# Patient Record
Sex: MALE | Race: BLACK OR AFRICAN AMERICAN | NOT HISPANIC OR LATINO | URBAN - METROPOLITAN AREA
[De-identification: names, ages, dates, MRNs, and addresses within clinical notes are randomized per-mention and may not be internally consistent; named-entity substitution may affect disease eponyms.]

---

## 2017-05-23 ENCOUNTER — OFFICE (OUTPATIENT)
Dept: URBAN - METROPOLITAN AREA CLINIC 12 | Facility: CLINIC | Age: 57
End: 2017-05-23

## 2017-05-23 VITALS
HEART RATE: 80 BPM | DIASTOLIC BLOOD PRESSURE: 81 MMHG | HEIGHT: 71 IN | WEIGHT: 164 LBS | SYSTOLIC BLOOD PRESSURE: 136 MMHG

## 2017-05-23 DIAGNOSIS — K85.90 ACUTE PANCREATITIS WITHOUT NECROSIS OR INFECTION, UNSPECIFIE: ICD-10-CM

## 2017-05-23 DIAGNOSIS — R93.3 ABNORMAL FINDINGS ON DIAGNOSTIC IMAGING OF OTHER PARTS OF DI: ICD-10-CM

## 2017-05-23 DIAGNOSIS — K86.1 OTHER CHRONIC PANCREATITIS: ICD-10-CM

## 2017-05-23 DIAGNOSIS — R10.13 EPIGASTRIC PAIN: ICD-10-CM

## 2017-05-23 DIAGNOSIS — R63.4 ABNORMAL WEIGHT LOSS: ICD-10-CM

## 2017-05-23 DIAGNOSIS — F17.200 NICOTINE DEPENDENCE, UNSPECIFIED, UNCOMPLICATED: ICD-10-CM

## 2017-05-23 PROCEDURE — 36415 COLL VENOUS BLD VENIPUNCTURE: CPT | Performed by: INTERNAL MEDICINE

## 2017-05-23 PROCEDURE — 99202 OFFICE O/P NEW SF 15 MIN: CPT | Performed by: INTERNAL MEDICINE

## 2017-05-23 NOTE — SERVICENOTES
The patient has evidence of chronic pancreatitis given extensive calcifications in the pancreas which could also explain the ductal dilation, however with that said he does also mild dilation of the bile duct and so could have either scarring of the area of the major papilla or could have a mass of the pancreas or ampulla as well.  He did have recent acute pancreatitis with inflammation around the head of the pancreas and so this would limit EUS evaluation at this time, however given his elevation of his liver enzymes he will likely need this sooner rather than later.  We will allow for a couple more weeks for the inflammation did die down and proceed with EUS for further evaluation in two weeks as opposed to waiting longer which I would prefer normally.  He should otherwise notify us of his abdominal pain returns or has any evidence of acute pancreatitis.  Then a low-fat diet and stay away from alcohol use.  He should stop smoking.  We may consider a trial of pancreatic enzymes if he has recurrent acute pancreatitis or starts having persistent abdominal pain or diarrhea.  He also has some sludge in his gallbladder and so would likely benefit from cholecystectomy, however he will need EUS evaluation 1st prior to any surgery.  He does have persistent elevation of his bile ducts and EUS is negative for mass he will likely need ERCP evaluation.  In interrupted recommend he continue nutritional supplements and stay on a low-fat diet.  He should notify us of his weight loss persists.

## 2017-05-23 NOTE — SERVICEHPINOTES
Mr. Melchor is a 57-year-old man here for evaluation of acute pancreatitis, chronic pancreatitis, and abnormal GI imaging. The patient states that he normally does not have any issues with abdominal pain but then started having issues in early 2017 for which she went to an urgent care. He states that because of acute worsening pain Lana the emergency department in early May 2017 where he was having epigastric pain that radiated around to his back. Blood work at that time did not reveal evidence of acute pancreatitis with lipase 1400. He also had CT scan of the abdomen that revealed findings of acute on chronic pancreatitis with extensive pancreatic calcification and pancreatic duct dilation with a "chain of lakes" appearance. The pancreatic head and neck were edematous with peripancreatic fluid and a 1.6 cm pancreatic head pseudocyst was noted. He underwent abdominal ultrasound as well which revealed a small amount of sludge in the gallbladder and some mild intra-and extra hepatic biliary ductal dilation with CBD measuring 8 mm. MRCP without contrast was also performed that again confirmed dilation of the pancreatic duct to 11 mm in the head, 9 mm in the body, and 6 mm in the tail. The common bile duct measured 8 mm. There was smooth tapering to the distal bile duct with no evidence of any mass of the distal aspect was not well seen. The patient was treated conservatively and was on be discharged couple days later. He does have elevation of his liver enzymes with most recent ones being bilirubin 1.6, alkaline phosphatase 500, , and . IgG4 subclasses were normal. Acute hepatitis panel was negative. It does not appear that CA-19-9 was checked. He states that overall he is doing better now. He does not have any abdominal pain but does note some symptoms that he does not eat too much. He states that he has had some constipation issues and denies any diarrhea. There is been no rectal bleeding. He has never had a colonoscopy. There is no family history of colon cancer, colon polyps, stomach cancer, or pancreatic cancer. He does have an extensive history of drinking alcohol at least 2-3 drinks a day for several years. He states that sometimes he would drink heavier. He states that he has lost around 10 pounds over the past month. He states that he stopped drinking four months ago. He does continue to smoke.

## 2017-05-24 LAB
CBC COMPLETE BLOOD COUNT W/O DIFF: HEMATOCRIT: 37.1 % — LOW (ref 39–55)
CBC COMPLETE BLOOD COUNT W/O DIFF: HEMOGLOBIN: 12.1 G/DL — LOW (ref 13–17.5)
CBC COMPLETE BLOOD COUNT W/O DIFF: MCH: 28.4 PG (ref 25–35)
CBC COMPLETE BLOOD COUNT W/O DIFF: MCHC: 32.6 % (ref 30–38)
CBC COMPLETE BLOOD COUNT W/O DIFF: MCV: 87.1 FL (ref 78–102)
CBC COMPLETE BLOOD COUNT W/O DIFF: PLATELET COUNT: 420 K/UL (ref 150–450)
CBC COMPLETE BLOOD COUNT W/O DIFF: RBC DISTRIBUTION WIDTH: 14.3 % (ref 11.5–16)
CBC COMPLETE BLOOD COUNT W/O DIFF: RED BLOOD CELL COUNT: 4.26 M/UL — LOW (ref 4.3–5.7)
CBC COMPLETE BLOOD COUNT W/O DIFF: WHITE BLOOD CELL COUNT: 7.3 K/UL (ref 4–11)
COMPREHENSIVE METABOLIC PANEL: ALBUMIN: 4.2 G/DL (ref 3.5–5.2)
COMPREHENSIVE METABOLIC PANEL: ALKALINE PHOSPHATASE: 611 U/L — HIGH (ref 46–118)
COMPREHENSIVE METABOLIC PANEL: CALCIUM TOTAL: 10.3 MG/DL (ref 8.5–10.5)
COMPREHENSIVE METABOLIC PANEL: CARBON DIOXIDE: 27 MEQ/L (ref 21–31)
COMPREHENSIVE METABOLIC PANEL: CHLORIDE: 101 MEQ/L (ref 96–106)
COMPREHENSIVE METABOLIC PANEL: CREATININE: 0.79 MG/DL — LOW (ref 0.8–1.4)
COMPREHENSIVE METABOLIC PANEL: FASTING/NON-FASTING: (no result)
COMPREHENSIVE METABOLIC PANEL: GLUCOSE: 87 MG/DL (ref 65–100)
COMPREHENSIVE METABOLIC PANEL: POTASSIUM: 5.1 MEQ/ML (ref 3.5–5.4)
COMPREHENSIVE METABOLIC PANEL: SGOT (AST): 147 U/L — HIGH (ref 13–40)
COMPREHENSIVE METABOLIC PANEL: SGPT (ALT): 161 U/L — HIGH (ref 7–52)
COMPREHENSIVE METABOLIC PANEL: SODIUM: 141 MEQ/L (ref 135–145)
COMPREHENSIVE METABOLIC PANEL: TOTAL BILIRUBIN: 0.8 MG/DL (ref 0.3–1.2)
COMPREHENSIVE METABOLIC PANEL: TOTAL PROTEIN: 6.7 G/DL (ref 6.4–8.3)
COMPREHENSIVE METABOLIC PANEL: UREA NITROGEN: 6 MG/DL (ref 6–20)

## 2017-06-06 ENCOUNTER — ON CAMPUS - OUTPATIENT (OUTPATIENT)
Dept: URBAN - METROPOLITAN AREA HOSPITAL 125 | Facility: HOSPITAL | Age: 57
End: 2017-06-06

## 2017-06-06 DIAGNOSIS — K86.0 ALCOHOL-INDUCED CHRONIC PANCREATITIS: ICD-10-CM

## 2017-06-06 DIAGNOSIS — K85.90 ACUTE PANCREATITIS WITHOUT NECROSIS OR INFECTION, UNSPECIFIE: ICD-10-CM

## 2017-06-06 DIAGNOSIS — K83.8 OTHER SPECIFIED DISEASES OF BILIARY TRACT: ICD-10-CM

## 2017-06-06 PROCEDURE — 43237 ENDOSCOPIC US EXAM ESOPH: CPT | Performed by: INTERNAL MEDICINE

## 2017-07-07 ENCOUNTER — OFFICE (OUTPATIENT)
Dept: URBAN - METROPOLITAN AREA CLINIC 11 | Facility: CLINIC | Age: 57
End: 2017-07-07

## 2017-07-07 DIAGNOSIS — F17.200 NICOTINE DEPENDENCE, UNSPECIFIED, UNCOMPLICATED: ICD-10-CM

## 2017-07-07 DIAGNOSIS — K86.1 OTHER CHRONIC PANCREATITIS: ICD-10-CM

## 2017-07-07 DIAGNOSIS — R10.13 EPIGASTRIC PAIN: ICD-10-CM

## 2017-07-07 DIAGNOSIS — K59.00 CONSTIPATION, UNSPECIFIED: ICD-10-CM

## 2017-07-07 DIAGNOSIS — R94.5 ABNORMAL RESULTS OF LIVER FUNCTION STUDIES: ICD-10-CM

## 2017-07-07 DIAGNOSIS — R63.4 ABNORMAL WEIGHT LOSS: ICD-10-CM

## 2017-07-07 PROCEDURE — 99213 OFFICE O/P EST LOW 20 MIN: CPT | Performed by: INTERNAL MEDICINE

## 2017-07-10 ENCOUNTER — INPATIENT HOSPITAL (OUTPATIENT)
Dept: URBAN - METROPOLITAN AREA HOSPITAL 95 | Facility: HOSPITAL | Age: 57
End: 2017-07-10

## 2017-07-10 DIAGNOSIS — K85.90 ACUTE PANCREATITIS WITHOUT NECROSIS OR INFECTION, UNSPECIFIE: ICD-10-CM

## 2017-07-10 DIAGNOSIS — K86.0 ALCOHOL-INDUCED CHRONIC PANCREATITIS: ICD-10-CM

## 2017-07-10 DIAGNOSIS — K83.8 OTHER SPECIFIED DISEASES OF BILIARY TRACT: ICD-10-CM

## 2017-07-10 PROCEDURE — 99253 IP/OBS CNSLTJ NEW/EST LOW 45: CPT | Performed by: INTERNAL MEDICINE

## 2017-07-11 PROCEDURE — 43274 ERCP DUCT STENT PLACEMENT: CPT | Performed by: INTERNAL MEDICINE

## 2017-07-24 ENCOUNTER — OFFICE (OUTPATIENT)
Dept: URBAN - METROPOLITAN AREA CLINIC 11 | Facility: CLINIC | Age: 57
End: 2017-07-24

## 2017-07-24 DIAGNOSIS — K86.0 ALCOHOL-INDUCED CHRONIC PANCREATITIS: ICD-10-CM

## 2017-07-24 DIAGNOSIS — K85.90 ACUTE PANCREATITIS WITHOUT NECROSIS OR INFECTION, UNSPECIFIE: ICD-10-CM

## 2017-08-07 ENCOUNTER — OFFICE (OUTPATIENT)
Dept: URBAN - METROPOLITAN AREA CLINIC 11 | Facility: CLINIC | Age: 57
End: 2017-08-07

## 2017-08-07 VITALS
WEIGHT: 156 LBS | HEART RATE: 52 BPM | DIASTOLIC BLOOD PRESSURE: 81 MMHG | SYSTOLIC BLOOD PRESSURE: 130 MMHG | HEIGHT: 71 IN

## 2017-08-07 DIAGNOSIS — R93.3 ABNORMAL FINDINGS ON DIAGNOSTIC IMAGING OF OTHER PARTS OF DI: ICD-10-CM

## 2017-08-07 DIAGNOSIS — K86.1 OTHER CHRONIC PANCREATITIS: ICD-10-CM

## 2017-08-07 DIAGNOSIS — R94.5 ABNORMAL RESULTS OF LIVER FUNCTION STUDIES: ICD-10-CM

## 2017-08-07 DIAGNOSIS — K85.90 ACUTE PANCREATITIS WITHOUT NECROSIS OR INFECTION, UNSPECIFIE: ICD-10-CM

## 2017-08-07 DIAGNOSIS — F17.200 NICOTINE DEPENDENCE, UNSPECIFIED, UNCOMPLICATED: ICD-10-CM

## 2017-08-07 DIAGNOSIS — K59.00 CONSTIPATION, UNSPECIFIED: ICD-10-CM

## 2017-08-07 LAB
HEPATIC FUNCTION PANEL A: ALBUMIN: 4.1 G/DL (ref 3.5–5.2)
HEPATIC FUNCTION PANEL A: ALKALINE PHOSPHATASE: 135 U/L — HIGH (ref 46–118)
HEPATIC FUNCTION PANEL A: DIRECT BILIRUBIN: 0.5 MG/DL — HIGH (ref 0–0.2)
HEPATIC FUNCTION PANEL A: SGOT (AST): 17 U/L (ref 13–40)
HEPATIC FUNCTION PANEL A: SGPT (ALT): 35 U/L (ref 7–52)
HEPATIC FUNCTION PANEL A: TOTAL BILIRUBIN: 1 MG/DL (ref 0.3–1.2)
HEPATIC FUNCTION PANEL A: TOTAL PROTEIN: 6.4 G/DL (ref 6.4–8.3)

## 2017-08-07 PROCEDURE — 99213 OFFICE O/P EST LOW 20 MIN: CPT | Performed by: INTERNAL MEDICINE

## 2017-08-07 RX ORDER — PANCRELIPASE 24000; 76000; 120000 [USP'U]/1; [USP'U]/1; [USP'U]/1
CAPSULE, DELAYED RELEASE PELLETS ORAL
Qty: 240 | Refills: 6 | Status: COMPLETED
Start: 2017-08-07 | End: 2017-08-16

## 2017-08-07 RX ORDER — PANCRELIPASE 24000; 76000; 120000 [USP'U]/1; [USP'U]/1; [USP'U]/1
72 CAPSULE, DELAYED RELEASE PELLETS ORAL
Qty: 72 | Refills: 0 | Status: COMPLETED
Start: 2017-08-07 | End: 2017-08-16

## 2017-08-07 NOTE — SERVICENOTES
The patient has severe chronic calcific pancreatitis with biliary and pancreatic duct stricture.  He has had multiple imaging studies including EUS, CT, and MRI in all have been negative for any mass, however all are limited due to the presence of extensive calcifications.  He did have a recent biliary pancreatitis and obstructive picture earlier this past month for which he underwent stenting and he has done very well since then.  He has been able to gain back some weight.  He states that his symptoms have resolved.  I will go ahead and start him on Creon to see if this may help with absorption and I will refer him to 1 of my associates he does pancreatic ERCP for evaluation of his pancreatic duct stricture, brushings, and stenting if needed.  The patient was told to call back if he starts having more symptoms, worse abdominal pain, are more weight loss.  We will need to continue to watch him closely with surveillance imaging.

## 2017-08-07 NOTE — SERVICEHPINOTES
Mr. Melchor is a 57-year-old man here for follow up of acute pancreatitis, chronic pancreatitis, and abnormal GI imaging. The patient states that he normally does not have any issues with abdominal pain but then started having issues in early 2017 for which he went to an urgent care. He states that because of acute worsening pain he went to the emergency department in early May 2017 where he was having epigastric pain that radiated around to his back. Blood work at that time did reveal evidence of acute pancreatitis with lipase 1400. He also had CT scan of the abdomen that revealed findings of acute on chronic pancreatitis with extensive pancreatic calcification and pancreatic duct dilation with a "chain of lakes" appearance. The pancreatic head and neck were edematous with peripancreatic fluid and a 1.6 cm pancreatic head pseudocyst was noted. He underwent abdominal ultrasound as well which revealed a small amount of sludge in the gallbladder and some mild intra-and extra hepatic biliary ductal dilation with CBD measuring 8 mm. MRCP without contrast was also performed that again confirmed dilation of the pancreatic duct to 11 mm in the head, 9 mm in the body, and 6 mm in the tail. The common bile duct measured 8 mm. There was smooth tapering to the distal bile duct with no evidence of any mass of the distal aspect was not well seen. The patient was treated conservatively and was discharged a couple days later. IgG4 subclasses were normal. Acute hepatitis panel was negative. He was then seen by me at the end of March 2017 and at that time was feeling better without any pain. He did have elevation of liver enzymes when in the hospital but when we rechecked them he had improvement with normalization of his bilirubin. His ALT and AST were both around 150 and alk-phos was around 600. Because of his findings we performed upper endoscopic ultrasound in early June 2017. The exam was very limited due to extensive pancreatic calcifications throughout the entire pancreas. He did have evidence of some bile reflux gastritis. Based on the EUS he did have dilation of his pancreatic duct measuring around 10 mm in the head, 5 mm in the body, and 3 mm in the tail there was also a 7 mm stone in the pancreatic duct. The common bile duct was mildly dilated to around 8 mm with smooth narrowing of this into the area of the pancreas with no definite mass seen, however views were extensively limited due to significant amount of calcifications there was a small common bile duct stone as well. Findings were consistent with chronic calcific pancreatitis. There is also a 1.5 cm cyst in the head of the pancreas with some internal debris consistent with a pseudocyst and a smaller 8 mm cyst near by. These were not biopsied due to likely be a pseudocyst and risk of infection. At that time we also checked liver enzymes which revealed near normalization of his liver enzymes with all normal except for mild elevation of alkaline phosphatase to around 245. I did discuss this with colleagues to perform ERCP who recommended surveillance imaging with MRI given that his liver enzymes had all but improved, but if liver enzymes worsen then he would likely need ERCP. He then came back to see me in early July with scleral icterus and dark urine. He also started having some more abdominal pain at that time. Lab work did reveal evidence of acute pancreatitis with elevation of his lipase and amylase and he also had significant increase in his liver enzymes with bilirubin 12, , , and alk-phos 400. We did check a CA 19 9 at that time which was up at 224, however this was rechecked in the hospital and was actually down to near normal. MRI/MRCP in the hospital with contrast was again negative for any mass. Because of his findings he did go to the hospital where he was seen by 1 of my associates and ERCP was performed for which revealed a distal common bile duct stricture about 1.5 cm in length. Sphincterotomy and stent placement were performed and some Sludge was able to be removed. Brushings of the stricture were also performed and were negative. The patient states that he has slowly improved. He did have some abdominal pain after discharge but over the past week he states that he has had no abdominal pain. His constipation is also under better control. He continues to take a PPI. He tends toward constipation has no diarrhea. He states he is eating better and has gained 4 lb since her last visit. He denies any fevers or chills.BR

## 2017-08-16 ENCOUNTER — OFFICE (OUTPATIENT)
Dept: URBAN - METROPOLITAN AREA CLINIC 11 | Facility: CLINIC | Age: 57
End: 2017-08-16

## 2017-08-16 VITALS
DIASTOLIC BLOOD PRESSURE: 72 MMHG | HEIGHT: 71 IN | SYSTOLIC BLOOD PRESSURE: 127 MMHG | HEART RATE: 50 BPM | WEIGHT: 168 LBS

## 2017-08-16 DIAGNOSIS — K83.1 OBSTRUCTION OF BILE DUCT: ICD-10-CM

## 2017-08-16 DIAGNOSIS — K86.1 OTHER CHRONIC PANCREATITIS: ICD-10-CM

## 2017-08-16 DIAGNOSIS — K86.89 OTHER SPECIFIED DISEASES OF PANCREAS: ICD-10-CM

## 2017-08-16 PROCEDURE — 99213 OFFICE O/P EST LOW 20 MIN: CPT

## 2017-08-28 ENCOUNTER — ON CAMPUS - OUTPATIENT (OUTPATIENT)
Dept: URBAN - METROPOLITAN AREA HOSPITAL 97 | Facility: HOSPITAL | Age: 57
End: 2017-08-28
Payer: COMMERCIAL

## 2017-08-28 DIAGNOSIS — K85.90 ACUTE PANCREATITIS WITHOUT NECROSIS OR INFECTION, UNSPECIFIE: ICD-10-CM

## 2017-08-28 DIAGNOSIS — K80.50 CALCULUS OF BILE DUCT WITHOUT CHOLANGITIS OR CHOLECYSTITIS W: ICD-10-CM

## 2017-08-28 DIAGNOSIS — K83.1 OBSTRUCTION OF BILE DUCT: ICD-10-CM

## 2017-08-28 DIAGNOSIS — K86.0 ALCOHOL-INDUCED CHRONIC PANCREATITIS: ICD-10-CM

## 2017-08-28 DIAGNOSIS — K83.8 OTHER SPECIFIED DISEASES OF BILIARY TRACT: ICD-10-CM

## 2017-08-28 DIAGNOSIS — K21.9 GASTRO-ESOPHAGEAL REFLUX DISEASE WITHOUT ESOPHAGITIS: ICD-10-CM

## 2017-08-28 PROCEDURE — 43276 ERCP STENT EXCHANGE W/DILATE: CPT

## 2017-08-28 PROCEDURE — 43264 ERCP REMOVE DUCT CALCULI: CPT | Mod: 59

## 2017-09-15 ENCOUNTER — OFFICE (OUTPATIENT)
Dept: URBAN - METROPOLITAN AREA CLINIC 11 | Facility: CLINIC | Age: 57
End: 2017-09-15

## 2017-09-15 DIAGNOSIS — K85.90 ACUTE PANCREATITIS WITHOUT NECROSIS OR INFECTION, UNSPECIFIE: ICD-10-CM

## 2017-09-15 DIAGNOSIS — K86.0 ALCOHOL-INDUCED CHRONIC PANCREATITIS: ICD-10-CM

## 2017-09-29 ENCOUNTER — OFFICE (OUTPATIENT)
Dept: URBAN - METROPOLITAN AREA CLINIC 11 | Facility: CLINIC | Age: 57
End: 2017-09-29
Payer: COMMERCIAL

## 2017-09-29 VITALS
DIASTOLIC BLOOD PRESSURE: 85 MMHG | WEIGHT: 156 LBS | HEIGHT: 71 IN | HEART RATE: 53 BPM | SYSTOLIC BLOOD PRESSURE: 129 MMHG

## 2017-09-29 DIAGNOSIS — R10.13 EPIGASTRIC PAIN: ICD-10-CM

## 2017-09-29 DIAGNOSIS — K86.1 OTHER CHRONIC PANCREATITIS: ICD-10-CM

## 2017-09-29 DIAGNOSIS — K59.00 CONSTIPATION, UNSPECIFIED: ICD-10-CM

## 2017-09-29 DIAGNOSIS — F17.200 NICOTINE DEPENDENCE, UNSPECIFIED, UNCOMPLICATED: ICD-10-CM

## 2017-09-29 DIAGNOSIS — K83.1 OBSTRUCTION OF BILE DUCT: ICD-10-CM

## 2017-09-29 DIAGNOSIS — R63.4 ABNORMAL WEIGHT LOSS: ICD-10-CM

## 2017-09-29 DIAGNOSIS — K86.89 OTHER SPECIFIED DISEASES OF PANCREAS: ICD-10-CM

## 2017-09-29 PROCEDURE — 99213 OFFICE O/P EST LOW 20 MIN: CPT | Performed by: INTERNAL MEDICINE

## 2017-09-29 RX ORDER — PANCRELIPASE 40000; 136000; 218000 [USP'U]/1; [USP'U]/1; [USP'U]/1
CAPSULE, DELAYED RELEASE ORAL
Qty: 240 | Refills: 6 | Status: COMPLETED
Start: 2017-09-29 | End: 2017-12-29

## 2017-09-29 NOTE — SERVICENOTES
The patient has significant chronic calcific pancreatitis with likely an underlying smoldering acute pancreatitis who has a severe biliary stricture as well as a stricture of the pancreatic duct with a small duct stone.  EUS, while limited, did not reveal any definite mass.  He has had biliary brushings x2 which have both been negative.  He did have an elevation of his CA 19 9 last month but this was in the setting of acute pancreatitis and recheck was normal. He is doing better since starting send Pap and tolerates this better than Creon.  Because of this we will have him take 2 capsules with meals and 1 with snacks.  I did again recommend that he stop smoking and start taking nutrition supplements.  He should stay on a low-fat diet. given that his imaging has been suboptimal because of his significant calcifications pancreas which could hide a possible neoplasm I will go ahead and schedule him for another MRI.  He is scheduled for repeat ERCP with evaluation of the tight biliary stricture with Dr. Roberto in November.  If this tight stricture persists then the patient may need to be referred for Whipple.

## 2017-09-29 NOTE — SERVICEHPINOTES
Mr. Melchor is a 57-year-old man here for follow up of acute pancreatitis, chronic pancreatitis, and abnormal GI imaging. The patient states that he normally does not have any issues with abdominal pain but then started having issues in early 2017 for which he went to an urgent care. He states that because of acute worsening pain he went to the emergency department in early May 2017 where he was having epigastric pain that radiated around to his back. Blood work at that time did reveal evidence of acute pancreatitis with lipase 1400. He also had CT scan of the abdomen that revealed findings of acute on chronic pancreatitis with extensive pancreatic calcification and pancreatic duct dilation with a "chain of lakes" appearance. The pancreatic head and neck were edematous with peripancreatic fluid and a 1.6 cm pancreatic head pseudocyst was noted. He underwent abdominal ultrasound as well which revealed a small amount of sludge in the gallbladder and some mild intra-and extra hepatic biliary ductal dilation with CBD measuring 8 mm. MRCP without contrast was also performed that again confirmed dilation of the pancreatic duct to 11 mm in the head, 9 mm in the body, and 6 mm in the tail. The common bile duct measured 8 mm. There was smooth tapering to the distal bile duct with no evidence of any mass of the distal aspect was not well seen. The patient was treated conservatively and was discharged a couple days later. IgG4 subclasses were normal. Acute hepatitis panel was negative. He was then seen by me at the end of March 2017 and at that time was feeling better without any pain. He did have elevation of liver enzymes when in the hospital but when we rechecked them he had improvement with normalization of his bilirubin. His ALT and AST were both around 150 and alk-phos was around 600. Because of his findings we performed upper endoscopic ultrasound in early June 2017. The exam was very limited due to extensive pancreatic calcifications throughout the entire pancreas. He did have evidence of some bile reflux gastritis. Based on the EUS he did have dilation of his pancreatic duct measuring around 10 mm in the head, 5 mm in the body, and 3 mm in the tail there was also a 7 mm stone in the pancreatic duct. The common bile duct was mildly dilated to around 8 mm with smooth narrowing of this into the area of the pancreas with no definite mass seen, however views were extensively limited due to significant amount of calcifications there was a small common bile duct stone as well. Findings were consistent with chronic calcific pancreatitis. There is also a 1.5 cm cyst in the head of the pancreas with some internal debris consistent with a pseudocyst and a smaller 8 mm cyst near by. These were not biopsied due to likely be a pseudocyst and risk of infection. At that time we also checked liver enzymes which revealed near normalization of his liver enzymes with all normal except for mild elevation of alkaline phosphatase to around 245. I did discuss this with colleagues to perform ERCP who recommended surveillance imaging with MRI given that his liver enzymes had all but improved, but if liver enzymes worsen then he would likely need ERCP. He then came back to see me in early July with scleral icterus and dark urine. He also started having some more abdominal pain at that time. Lab work did reveal evidence of acute pancreatitis with elevation of his lipase and amylase and he also had significant increase in his liver enzymes with bilirubin 12, , , and alk-phos 400. We did check a CA 19 9 at that time (in the setting of acute pancreatitis) which was up at 224, however this was rechecked in the hospital and was actually down to near normal. MRI/MRCP in the hospital with contrast was again negative for any mass. Because of his findings he did go to the hospital where he was seen by one of my associates and ERCP was performed for which revealed a distal common bile duct stricture about 1.5 cm in length. Sphincterotomy and stent placement were performed and some Sludge was able to be removed. Brushings of the stricture were also performed and were negative. The patient states that he has slowly improved. Because of his stricture and common bile duct stone I did refer him to Dr. Roberto for pancreatic ERCP and brushings as well as further evaluation of the biliary stricture. He underwent ERCP by Dr. Roberto at the end of August 2017 which at that time revealed a very tight biliary stricture which was about 2 to 2-1/2 cm in length. There was a stone above the stricture that was removed by balloon sweep. The stricture was dilated, though only with minimal effect and so because of this a fully covered Wallstent was placed. Brushings were also performed which were negative. He did not address the pancreatic duct at that time because of the significant maneuvers that were performed above which would increase his risk of pancreatitis and so this was to be re-evaluated when he follows up for his next ERCP which is scheduled for sometime in November. The patient then called me 2 weeks ago stating he was having some more abdominal pain and some darker urine so we had him come in for blood work which was completely normal including normal CBC. There was some minimal elevation of lipase and amylase but they were not quite above 3 times upper limit of normal which means that he likely has a low-grade component of some acute pancreatitis on top of his chronic calcific pancreatitis. because of this we started him on pancreatic enzymes with Zenpep as well as give him a short course of tramadol. The patient states that overall he is doing better though we still does have some occasional bad days. On these days he will have some epigastric and right upper quadrant pain that sometimes radiates to his back. He denies any problems with diarrhea and states that he is actually more constipated, having a bowel movement every 2-3 days. He only takes MiraLax on an as-needed basis. He denies any fevers or chills. His weight has fluctuated as when he saw me in early August it was 156 but when Dr. Roberto saw him in mid August it was 168 and then today it is now 156 again. When we saw him in July it was down to 152.BR

## 2017-11-08 ENCOUNTER — OFFICE (OUTPATIENT)
Dept: URBAN - METROPOLITAN AREA CLINIC 11 | Facility: CLINIC | Age: 57
End: 2017-11-08
Payer: COMMERCIAL

## 2017-11-08 VITALS
SYSTOLIC BLOOD PRESSURE: 157 MMHG | HEIGHT: 71 IN | DIASTOLIC BLOOD PRESSURE: 91 MMHG | HEART RATE: 58 BPM | WEIGHT: 161 LBS

## 2017-11-08 DIAGNOSIS — R93.3 ABNORMAL FINDINGS ON DIAGNOSTIC IMAGING OF OTHER PARTS OF DI: ICD-10-CM

## 2017-11-08 DIAGNOSIS — K59.00 CONSTIPATION, UNSPECIFIED: ICD-10-CM

## 2017-11-08 DIAGNOSIS — K86.1 OTHER CHRONIC PANCREATITIS: ICD-10-CM

## 2017-11-08 DIAGNOSIS — K86.89 OTHER SPECIFIED DISEASES OF PANCREAS: ICD-10-CM

## 2017-11-08 DIAGNOSIS — K83.1 OBSTRUCTION OF BILE DUCT: ICD-10-CM

## 2017-11-08 DIAGNOSIS — R94.5 ABNORMAL RESULTS OF LIVER FUNCTION STUDIES: ICD-10-CM

## 2017-11-08 DIAGNOSIS — F17.200 NICOTINE DEPENDENCE, UNSPECIFIED, UNCOMPLICATED: ICD-10-CM

## 2017-11-08 DIAGNOSIS — K85.90 ACUTE PANCREATITIS WITHOUT NECROSIS OR INFECTION, UNSPECIFIE: ICD-10-CM

## 2017-11-08 LAB
CA 19-9: 22 U/ML (ref 0–35)
CBC, PLATELET, NO DIFFERENTIAL: HEMATOCRIT: 36.7 % — LOW (ref 37.5–51)
CBC, PLATELET, NO DIFFERENTIAL: HEMOGLOBIN: 11.8 G/DL — LOW (ref 12.6–17.7)
CBC, PLATELET, NO DIFFERENTIAL: MCH: 28.6 PG (ref 26.6–33)
CBC, PLATELET, NO DIFFERENTIAL: MCHC: 32.2 G/DL (ref 31.5–35.7)
CBC, PLATELET, NO DIFFERENTIAL: MCV: 89 FL (ref 79–97)
CBC, PLATELET, NO DIFFERENTIAL: PLATELETS: 324 X10E3/UL (ref 150–379)
CBC, PLATELET, NO DIFFERENTIAL: RBC: 4.12 X10E6/UL — LOW (ref 4.14–5.8)
CBC, PLATELET, NO DIFFERENTIAL: RDW: 14 % (ref 12.3–15.4)
CBC, PLATELET, NO DIFFERENTIAL: WBC: 8.5 X10E3/UL (ref 3.4–10.8)
COMP. METABOLIC PANEL (14): A/G RATIO: 1.4 (ref 1.2–2.2)
COMP. METABOLIC PANEL (14): ALBUMIN, SERUM: 3.7 G/DL (ref 3.5–5.5)
COMP. METABOLIC PANEL (14): ALKALINE PHOSPHATASE, S: 70 IU/L (ref 39–117)
COMP. METABOLIC PANEL (14): ALT (SGPT): 10 IU/L (ref 0–44)
COMP. METABOLIC PANEL (14): AST (SGOT): 10 IU/L (ref 0–40)
COMP. METABOLIC PANEL (14): BILIRUBIN, TOTAL: 1 MG/DL (ref 0–1.2)
COMP. METABOLIC PANEL (14): BUN/CREATININE RATIO: 8 — LOW (ref 9–20)
COMP. METABOLIC PANEL (14): BUN: 6 MG/DL (ref 6–24)
COMP. METABOLIC PANEL (14): CALCIUM, SERUM: 9.3 MG/DL (ref 8.7–10.2)
COMP. METABOLIC PANEL (14): CARBON DIOXIDE, TOTAL: 27 MMOL/L (ref 18–29)
COMP. METABOLIC PANEL (14): CHLORIDE, SERUM: 105 MMOL/L (ref 96–106)
COMP. METABOLIC PANEL (14): CREATININE, SERUM: 0.79 MG/DL (ref 0.76–1.27)
COMP. METABOLIC PANEL (14): EGFR IF AFRICN AM: 116 ML/MIN/1.73 (ref 59–?)
COMP. METABOLIC PANEL (14): EGFR IF NONAFRICN AM: 100 ML/MIN/1.73 (ref 59–?)
COMP. METABOLIC PANEL (14): GLOBULIN, TOTAL: 2.6 G/DL (ref 1.5–4.5)
COMP. METABOLIC PANEL (14): GLUCOSE, SERUM: 80 MG/DL (ref 65–99)
COMP. METABOLIC PANEL (14): POTASSIUM, SERUM: 4.1 MMOL/L (ref 3.5–5.2)
COMP. METABOLIC PANEL (14): PROTEIN, TOTAL, SERUM: 6.3 G/DL (ref 6–8.5)
COMP. METABOLIC PANEL (14): SODIUM, SERUM: 144 MMOL/L (ref 134–144)

## 2017-11-08 PROCEDURE — 99213 OFFICE O/P EST LOW 20 MIN: CPT | Performed by: INTERNAL MEDICINE

## 2017-11-08 NOTE — SERVICEHPINOTES
Mr. Melchor is a 57-year-old man here for follow up of acute pancreatitis, chronic pancreatitis, and abnormal GI imaging. The patient states that he normally does not have any issues with abdominal pain but then started having issues in early 2017 for which he went to an urgent care. He states that because of acute worsening pain he went to the emergency department in early May 2017 where he was having epigastric pain that radiated around to his back. Blood work at that time did reveal evidence of acute pancreatitis with lipase 1400. He also had CT scan of the abdomen that revealed findings of acute on chronic pancreatitis with extensive pancreatic calcification and pancreatic duct dilation with a "chain of lakes" appearance. The pancreatic head and neck were edematous with peripancreatic fluid and a 1.6 cm pancreatic head pseudocyst was noted. He underwent abdominal ultrasound as well which revealed a small amount of sludge in the gallbladder and some mild intra-and extra hepatic biliary ductal dilation with CBD measuring 8 mm. MRCP without contrast was also performed that again confirmed dilation of the pancreatic duct to 11 mm in the head, 9 mm in the body, and 6 mm in the tail. The common bile duct measured 8 mm. There was smooth tapering to the distal bile duct with no evidence of any mass of the distal aspect was not well seen. The patient was treated conservatively and was discharged a couple days later. IgG4 subclasses were normal. Acute hepatitis panel was negative. He was then seen by me at the end of March 2017 and at that time was feeling better without any pain. He did have elevation of liver enzymes when in the hospital but when we rechecked them he had improvement with normalization of his bilirubin. His ALT and AST were both around 150 and alk-phos was around 600. Because of his findings we performed upper endoscopic ultrasound in early June 2017. The exam was very limited due to extensive pancreatic calcifications throughout the entire pancreas. He did have evidence of some bile reflux gastritis. Based on the EUS he did have dilation of his pancreatic duct measuring around 10 mm in the head, 5 mm in the body, and 3 mm in the tail there was also a 7 mm stone in the pancreatic duct. The common bile duct was mildly dilated to around 8 mm with smooth narrowing of this into the area of the pancreas with no definite mass seen, however views were extensively limited due to significant amount of calcifications there was a small common bile duct stone as well. Findings were consistent with chronic calcific pancreatitis. There is also a 1.5 cm cyst in the head of the pancreas with some internal debris consistent with a pseudocyst and a smaller 8 mm cyst near by. These were not biopsied due to likely be a pseudocyst and risk of infection. At that time we also checked liver enzymes which revealed near normalization of his liver enzymes with all normal except for mild elevation of alkaline phosphatase to around 245. I did discuss this with colleagues to perform ERCP who recommended surveillance imaging with MRI given that his liver enzymes had all but improved, but if liver enzymes worsen then he would likely need ERCP. He then came back to see me in early July with scleral icterus and dark urine. He also started having some more abdominal pain at that time. Lab work did reveal evidence of acute pancreatitis with elevation of his lipase and amylase and he also had significant increase in his liver enzymes with bilirubin 12, , , and alk-phos 400. We did check a CA 19 9 at that time (in the setting of acute pancreatitis) which was up at 224, however this was rechecked in the hospital and was actually down to near normal. MRI/MRCP in the hospital with contrast was again negative for any mass. Because of his findings he did go to the hospital where he was seen by one of my associates and ERCP was performed for which revealed a distal common bile duct stricture about 1.5 cm in length. Sphincterotomy and stent placement were performed and some Sludge was able to be removed. Brushings of the stricture were also performed and were negative. The patient states that he has slowly improved. Because of his stricture and common bile duct stone I did refer him to Dr. Roberto for pancreatic ERCP and brushings as well as further evaluation of the biliary stricture. He underwent ERCP by Dr. Roberto at the end of August 2017 which at that time revealed a very tight biliary stricture which was about 2 to 2-1/2 cm in length. There was a stone above the stricture that was removed by balloon sweep. The stricture was dilated, though only with minimal effect and so because of this a fully covered Wallstent was placed. Brushings were also performed which were negative. He did not address the pancreatic duct at that time because of the significant maneuvers that were performed above which would increase his risk of pancreatitis and so this was to be re-evaluated when he follows up for his next ERCP which is scheduled for sometime in November. When I last saw the patient he noted some abdominal pain and dark urine and so we checked blood work which was completely normal including normal CBC though there was a minimal elevation of lipase and amylase. Because of this we started him on Zenpep which did help some. He states that he actually stopped Zenpep after awhile and since then his abdominal pain has completely resolved. He has not had any change in bowel habits stating that he has regular bowel movements with no diarrhea. He states that he is now eating back to normal. He no longer has any abdominal pain except for an occasional twinge but nothing on a regular basis. He does continue to smoke but states he is cutting back. He continues to refrain from drinking. He has gained 5 pounds since our last visit. Since our last visit he did undergo another MRI pancreas protocol which again revealed persistent extensive dilation of the main pancreatic duct that has not changed. Findings were consistent with significant chronic pancreatitis. No mass was seen. There were gallstones seen. His intrahepatic biliary ductal dilation had improved after placement of the stent.BR

## 2017-11-08 NOTE — SERVICENOTES
The patient appears to be doing much better from a symptomatic standpoint.  I did recommend he continue to refrain from alcohol and stay on a low-fat diet.  He should resume pancreatic enzymes if abdominal pain returns and notify us if he has any problems of jaundice, etc.  He is due for average risk screening colonoscopy.  I did offer to perform this prior to his scheduled ERCP later this month, however the patient states that he would like to wait until after the ERCP which is reasonable.  During ERCP he will likely undergo further evaluation of his biliary and pancreatic duct strictures which are likely due to chronic pancreatitis.  Neoplasm is still in the differential but appears less likely based on negative serial imaging and the fact that his symptoms have basically been going on for nearly a year.

## 2017-12-29 ENCOUNTER — AMBULATORY SURGICAL CENTER (OUTPATIENT)
Dept: URBAN - METROPOLITAN AREA SURGERY 3 | Facility: SURGERY | Age: 57
End: 2017-12-29
Payer: COMMERCIAL

## 2017-12-29 ENCOUNTER — OFFICE (OUTPATIENT)
Dept: URBAN - METROPOLITAN AREA PATHOLOGY 22 | Facility: PATHOLOGY | Age: 57
End: 2017-12-29
Payer: COMMERCIAL

## 2017-12-29 VITALS
HEART RATE: 61 BPM | HEART RATE: 65 BPM | RESPIRATION RATE: 13 BRPM | DIASTOLIC BLOOD PRESSURE: 74 MMHG | OXYGEN SATURATION: 98 % | RESPIRATION RATE: 14 BRPM | DIASTOLIC BLOOD PRESSURE: 49 MMHG | TEMPERATURE: 97.2 F | DIASTOLIC BLOOD PRESSURE: 76 MMHG | SYSTOLIC BLOOD PRESSURE: 87 MMHG | SYSTOLIC BLOOD PRESSURE: 116 MMHG | OXYGEN SATURATION: 96 % | OXYGEN SATURATION: 98 % | SYSTOLIC BLOOD PRESSURE: 122 MMHG | OXYGEN SATURATION: 95 % | SYSTOLIC BLOOD PRESSURE: 122 MMHG | RESPIRATION RATE: 20 BRPM | HEART RATE: 87 BPM | DIASTOLIC BLOOD PRESSURE: 74 MMHG | WEIGHT: 161 LBS | HEART RATE: 66 BPM | DIASTOLIC BLOOD PRESSURE: 69 MMHG | SYSTOLIC BLOOD PRESSURE: 120 MMHG | DIASTOLIC BLOOD PRESSURE: 49 MMHG | HEART RATE: 61 BPM | DIASTOLIC BLOOD PRESSURE: 76 MMHG | TEMPERATURE: 97.3 F | SYSTOLIC BLOOD PRESSURE: 118 MMHG | RESPIRATION RATE: 20 BRPM | DIASTOLIC BLOOD PRESSURE: 69 MMHG | OXYGEN SATURATION: 97 % | HEIGHT: 71 IN | RESPIRATION RATE: 13 BRPM | TEMPERATURE: 97.3 F | SYSTOLIC BLOOD PRESSURE: 116 MMHG | HEIGHT: 71 IN | HEART RATE: 66 BPM | HEART RATE: 73 BPM | HEART RATE: 87 BPM | TEMPERATURE: 97.2 F | SYSTOLIC BLOOD PRESSURE: 118 MMHG | DIASTOLIC BLOOD PRESSURE: 72 MMHG | DIASTOLIC BLOOD PRESSURE: 72 MMHG | HEART RATE: 65 BPM | WEIGHT: 161 LBS | OXYGEN SATURATION: 96 % | OXYGEN SATURATION: 97 % | OXYGEN SATURATION: 95 % | RESPIRATION RATE: 14 BRPM | SYSTOLIC BLOOD PRESSURE: 120 MMHG | SYSTOLIC BLOOD PRESSURE: 87 MMHG | HEART RATE: 73 BPM

## 2017-12-29 DIAGNOSIS — K64.1 SECOND DEGREE HEMORRHOIDS: ICD-10-CM

## 2017-12-29 DIAGNOSIS — K63.5 POLYP OF COLON: ICD-10-CM

## 2017-12-29 DIAGNOSIS — Z12.11 ENCOUNTER FOR SCREENING FOR MALIGNANT NEOPLASM OF COLON: ICD-10-CM

## 2017-12-29 PROBLEM — D12.5 BENIGN NEOPLASM OF SIGMOID COLON: Status: ACTIVE | Noted: 2017-12-29

## 2017-12-29 LAB
HEPATIC FUNCTION PANEL (7): ALBUMIN, SERUM: 3.7 G/DL (ref 3.5–5.5)
HEPATIC FUNCTION PANEL (7): ALBUMIN, SERUM: 3.7 G/DL (ref 3.5–5.5)
HEPATIC FUNCTION PANEL (7): ALKALINE PHOSPHATASE, S: 80 IU/L (ref 39–117)
HEPATIC FUNCTION PANEL (7): ALKALINE PHOSPHATASE, S: 80 IU/L (ref 39–117)
HEPATIC FUNCTION PANEL (7): ALT (SGPT): 19 IU/L (ref 0–44)
HEPATIC FUNCTION PANEL (7): ALT (SGPT): 19 IU/L (ref 0–44)
HEPATIC FUNCTION PANEL (7): AST (SGOT): 20 IU/L (ref 0–40)
HEPATIC FUNCTION PANEL (7): AST (SGOT): 20 IU/L (ref 0–40)
HEPATIC FUNCTION PANEL (7): BILIRUBIN, DIRECT: 0.15 MG/DL (ref 0–0.4)
HEPATIC FUNCTION PANEL (7): BILIRUBIN, DIRECT: 0.15 MG/DL (ref 0–0.4)
HEPATIC FUNCTION PANEL (7): BILIRUBIN, TOTAL: 0.8 MG/DL (ref 0–1.2)
HEPATIC FUNCTION PANEL (7): BILIRUBIN, TOTAL: 0.8 MG/DL (ref 0–1.2)
HEPATIC FUNCTION PANEL (7): PROTEIN, TOTAL, SERUM: 7 G/DL (ref 6–8.5)
HEPATIC FUNCTION PANEL (7): PROTEIN, TOTAL, SERUM: 7 G/DL (ref 6–8.5)

## 2017-12-29 PROCEDURE — 45380 COLONOSCOPY AND BIOPSY: CPT | Mod: 33 | Performed by: INTERNAL MEDICINE

## 2017-12-29 PROCEDURE — 88305 TISSUE EXAM BY PATHOLOGIST: CPT | Performed by: INTERNAL MEDICINE
